# Patient Record
Sex: MALE | Race: WHITE
[De-identification: names, ages, dates, MRNs, and addresses within clinical notes are randomized per-mention and may not be internally consistent; named-entity substitution may affect disease eponyms.]

---

## 2019-11-17 ENCOUNTER — HOSPITAL ENCOUNTER (EMERGENCY)
Dept: HOSPITAL 26 - MED | Age: 34
Discharge: HOME | End: 2019-11-17
Payer: MEDICAID

## 2019-11-17 VITALS — BODY MASS INDEX: 27.47 KG/M2 | WEIGHT: 175 LBS | HEIGHT: 67 IN

## 2019-11-17 VITALS — DIASTOLIC BLOOD PRESSURE: 89 MMHG | SYSTOLIC BLOOD PRESSURE: 151 MMHG

## 2019-11-17 VITALS — SYSTOLIC BLOOD PRESSURE: 151 MMHG | DIASTOLIC BLOOD PRESSURE: 89 MMHG

## 2019-11-17 DIAGNOSIS — M54.5: Primary | ICD-10-CM

## 2019-11-17 DIAGNOSIS — Z98.890: ICD-10-CM

## 2019-11-17 DIAGNOSIS — G89.29: ICD-10-CM

## 2019-11-17 LAB
APPEARANCE UR: CLEAR
BARBITURATES UR QL SCN: (no result) NG/ML
BENZODIAZ UR QL SCN: (no result) NG/ML
BILIRUB UR QL STRIP: NEGATIVE
BZE UR QL SCN: (no result) NG/ML
CANNABINOIDS UR QL SCN: (no result) NG/ML
COLOR UR: YELLOW
GLUCOSE UR STRIP-MCNC: NEGATIVE MG/DL
HGB UR QL STRIP: NEGATIVE
LEUKOCYTE ESTERASE UR QL STRIP: NEGATIVE
NITRITE UR QL STRIP: NEGATIVE
OPIATES UR QL SCN: (no result) NG/ML
PCP UR QL SCN: (no result) NG/ML
PH UR STRIP: 6 [PH] (ref 5–9)

## 2019-11-17 PROCEDURE — 99283 EMERGENCY DEPT VISIT LOW MDM: CPT

## 2019-11-17 PROCEDURE — 80305 DRUG TEST PRSMV DIR OPT OBS: CPT

## 2019-11-17 PROCEDURE — 81003 URINALYSIS AUTO W/O SCOPE: CPT

## 2019-11-17 PROCEDURE — 96372 THER/PROPH/DIAG INJ SC/IM: CPT

## 2019-11-17 RX ADMIN — PROMETHAZINE HYDROCHLORIDE ONE MG: 25 INJECTION INTRAMUSCULAR; INTRAVENOUS at 12:25

## 2019-11-17 RX ADMIN — KETOROLAC TROMETHAMINE ONE MG: 60 INJECTION, SOLUTION INTRAMUSCULAR at 12:26

## 2019-11-17 RX ADMIN — SODIUM CHLORIDE ONE MG: 9 INJECTION, SOLUTION INTRAVENOUS at 12:25

## 2019-12-24 ENCOUNTER — HOSPITAL ENCOUNTER (EMERGENCY)
Dept: HOSPITAL 1 - ED | Age: 34
Discharge: LEFT BEFORE DISCHARGE | End: 2019-12-24
Payer: MEDICAID

## 2019-12-24 VITALS — SYSTOLIC BLOOD PRESSURE: 154 MMHG | DIASTOLIC BLOOD PRESSURE: 91 MMHG

## 2019-12-24 VITALS — BODY MASS INDEX: 26.83 KG/M2 | HEIGHT: 68 IN | WEIGHT: 177 LBS

## 2019-12-24 DIAGNOSIS — M54.5: Primary | ICD-10-CM

## 2020-07-23 ENCOUNTER — HOSPITAL ENCOUNTER (EMERGENCY)
Dept: HOSPITAL 26 - MED | Age: 35
Discharge: HOME | End: 2020-07-23
Payer: MEDICAID

## 2020-07-23 VITALS — HEIGHT: 68 IN | BODY MASS INDEX: 28.04 KG/M2 | WEIGHT: 185 LBS

## 2020-07-23 VITALS — DIASTOLIC BLOOD PRESSURE: 74 MMHG | SYSTOLIC BLOOD PRESSURE: 122 MMHG

## 2020-07-23 VITALS — DIASTOLIC BLOOD PRESSURE: 81 MMHG | SYSTOLIC BLOOD PRESSURE: 130 MMHG

## 2020-07-23 DIAGNOSIS — U07.1: Primary | ICD-10-CM

## 2020-07-23 PROCEDURE — 99283 EMERGENCY DEPT VISIT LOW MDM: CPT

## 2022-08-05 ENCOUNTER — HOSPITAL ENCOUNTER (EMERGENCY)
Dept: HOSPITAL 15 - ER | Age: 37
Discharge: HOME | End: 2022-08-05
Payer: MEDICAID

## 2022-08-05 VITALS — WEIGHT: 160.28 LBS | HEIGHT: 68 IN | BODY MASS INDEX: 24.29 KG/M2

## 2022-08-05 VITALS — DIASTOLIC BLOOD PRESSURE: 73 MMHG | SYSTOLIC BLOOD PRESSURE: 110 MMHG

## 2022-08-05 DIAGNOSIS — F17.210: ICD-10-CM

## 2022-08-05 DIAGNOSIS — M79.604: Primary | ICD-10-CM

## 2022-08-05 DIAGNOSIS — Z79.899: ICD-10-CM

## 2022-11-30 ENCOUNTER — HOSPITAL ENCOUNTER (EMERGENCY)
Dept: HOSPITAL 26 - MED | Age: 37
Discharge: HOME | End: 2022-11-30
Payer: MEDICAID

## 2022-11-30 VITALS — DIASTOLIC BLOOD PRESSURE: 78 MMHG | SYSTOLIC BLOOD PRESSURE: 125 MMHG

## 2022-11-30 VITALS — BODY MASS INDEX: 27.47 KG/M2 | HEIGHT: 67 IN | WEIGHT: 175 LBS

## 2022-11-30 VITALS — DIASTOLIC BLOOD PRESSURE: 76 MMHG | SYSTOLIC BLOOD PRESSURE: 118 MMHG

## 2022-11-30 DIAGNOSIS — L03.115: ICD-10-CM

## 2022-11-30 DIAGNOSIS — F17.210: ICD-10-CM

## 2022-11-30 DIAGNOSIS — S81.811A: Primary | ICD-10-CM
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
## 2022-11-30 PROCEDURE — 96372 THER/PROPH/DIAG INJ SC/IM: CPT

## 2022-11-30 PROCEDURE — 99283 EMERGENCY DEPT VISIT LOW MDM: CPT

## 2022-11-30 RX ADMIN — KETOROLAC TROMETHAMINE ONE MG: 30 INJECTION, SOLUTION INTRAMUSCULAR; INTRAVENOUS at 06:50

## 2022-11-30 NOTE — NUR
Patient discharged with v/s stable. Written and verbal after care instructions 
given and explained. 

Patient alert, oriented and verbalized understanding of instructions. 
Ambulatory with steady gait to home. All questions addressed prior to 
discharge. ID band removed. Patient advised to follow up with PMD. Rx of 
BACTRIM given. Patient educated on indication of medication including possible 
reaction and side effects. Opportunity to ask questions provided and answered.

## 2023-01-25 ENCOUNTER — HOSPITAL ENCOUNTER (EMERGENCY)
Dept: HOSPITAL 15 - ER | Age: 38
Discharge: HOME | End: 2023-01-25
Payer: MEDICAID

## 2023-01-25 VITALS
DIASTOLIC BLOOD PRESSURE: 67 MMHG | BODY MASS INDEX: 25.06 KG/M2 | WEIGHT: 165.35 LBS | SYSTOLIC BLOOD PRESSURE: 128 MMHG | HEIGHT: 68 IN

## 2023-01-25 DIAGNOSIS — F17.210: ICD-10-CM

## 2023-01-25 DIAGNOSIS — L03.011: Primary | ICD-10-CM

## 2023-01-25 DIAGNOSIS — Z79.899: ICD-10-CM

## 2023-01-25 DIAGNOSIS — F12.90: ICD-10-CM

## 2023-01-25 DIAGNOSIS — I10: ICD-10-CM

## 2023-01-25 DIAGNOSIS — F15.90: ICD-10-CM

## 2023-01-25 PROCEDURE — 73130 X-RAY EXAM OF HAND: CPT

## 2023-03-22 ENCOUNTER — HOSPITAL ENCOUNTER (EMERGENCY)
Dept: HOSPITAL 26 - MED | Age: 38
Discharge: HOME | End: 2023-03-22
Payer: COMMERCIAL

## 2023-03-22 VITALS — DIASTOLIC BLOOD PRESSURE: 95 MMHG | SYSTOLIC BLOOD PRESSURE: 130 MMHG

## 2023-03-22 VITALS — WEIGHT: 150 LBS | BODY MASS INDEX: 24.11 KG/M2 | HEIGHT: 66 IN

## 2023-03-22 DIAGNOSIS — Z79.899: ICD-10-CM

## 2023-03-22 DIAGNOSIS — J45.909: ICD-10-CM

## 2023-03-22 DIAGNOSIS — Z79.2: ICD-10-CM

## 2023-03-22 DIAGNOSIS — R12: Primary | ICD-10-CM

## 2023-03-22 PROCEDURE — 99283 EMERGENCY DEPT VISIT LOW MDM: CPT

## 2023-03-22 NOTE — NUR
Patient discharged with v/s stable. Written and verbal after care instructions 
given and explained. 

Patient verbalized understanding. Ambulatory with steady gait. All questions 
addressed prior to discharge. Advised to follow up with PMD. PT LEFT WITH HIS 
BELONGINGS

## 2023-03-27 ENCOUNTER — HOSPITAL ENCOUNTER (EMERGENCY)
Dept: HOSPITAL 26 - MED | Age: 38
LOS: 1 days | Discharge: LEFT BEFORE DISCHARGE | End: 2023-03-28
Payer: COMMERCIAL

## 2023-03-27 VITALS — BODY MASS INDEX: 25.01 KG/M2 | WEIGHT: 165 LBS | HEIGHT: 68 IN

## 2023-03-27 VITALS — DIASTOLIC BLOOD PRESSURE: 89 MMHG | SYSTOLIC BLOOD PRESSURE: 120 MMHG

## 2023-03-27 DIAGNOSIS — M79.661: Primary | ICD-10-CM

## 2023-03-27 DIAGNOSIS — Z53.21: ICD-10-CM

## 2023-07-29 ENCOUNTER — HOSPITAL ENCOUNTER (EMERGENCY)
Dept: HOSPITAL 15 - ER | Age: 38
LOS: 1 days | Discharge: LEFT BEFORE DISCHARGE | End: 2023-07-30
Payer: MEDICAID

## 2023-07-29 VITALS — WEIGHT: 125.22 LBS | BODY MASS INDEX: 18.98 KG/M2 | HEIGHT: 68 IN

## 2023-07-29 VITALS
HEART RATE: 103 BPM | RESPIRATION RATE: 18 BRPM | DIASTOLIC BLOOD PRESSURE: 78 MMHG | SYSTOLIC BLOOD PRESSURE: 108 MMHG | OXYGEN SATURATION: 97 %
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
## 2023-07-29 DIAGNOSIS — F10.129: ICD-10-CM
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
## 2023-07-29 DIAGNOSIS — Z79.899: ICD-10-CM
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
## 2023-07-29 DIAGNOSIS — F17.210: ICD-10-CM

## 2023-07-29 DIAGNOSIS — S09.90XA: Primary | ICD-10-CM

## 2023-07-29 DIAGNOSIS — R07.89: ICD-10-CM
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
## 2023-07-29 PROCEDURE — 71045 X-RAY EXAM CHEST 1 VIEW: CPT

## 2023-07-29 PROCEDURE — 70486 CT MAXILLOFACIAL W/O DYE: CPT

## 2023-07-29 PROCEDURE — 70450 CT HEAD/BRAIN W/O DYE: CPT

## 2023-07-31 ENCOUNTER — HOSPITAL ENCOUNTER (EMERGENCY)
Dept: HOSPITAL 15 - ER | Age: 38
Discharge: HOME | End: 2023-07-31
Payer: MEDICAID

## 2023-07-31 VITALS — RESPIRATION RATE: 16 BRPM | OXYGEN SATURATION: 97 %

## 2023-07-31 VITALS
HEIGHT: 68 IN | BODY MASS INDEX: 20.38 KG/M2 | DIASTOLIC BLOOD PRESSURE: 78 MMHG | WEIGHT: 134.48 LBS | HEART RATE: 88 BPM | TEMPERATURE: 98 F | SYSTOLIC BLOOD PRESSURE: 121 MMHG

## 2023-07-31 DIAGNOSIS — R51.9: ICD-10-CM

## 2023-07-31 DIAGNOSIS — Z79.1: ICD-10-CM

## 2023-07-31 DIAGNOSIS — Z79.899: ICD-10-CM

## 2023-07-31 DIAGNOSIS — J01.00: Primary | ICD-10-CM

## 2023-07-31 PROCEDURE — 96372 THER/PROPH/DIAG INJ SC/IM: CPT

## 2023-07-31 PROCEDURE — 94640 AIRWAY INHALATION TREATMENT: CPT

## 2023-07-31 PROCEDURE — 99283 EMERGENCY DEPT VISIT LOW MDM: CPT
